# Patient Record
Sex: MALE | Race: WHITE | NOT HISPANIC OR LATINO
[De-identification: names, ages, dates, MRNs, and addresses within clinical notes are randomized per-mention and may not be internally consistent; named-entity substitution may affect disease eponyms.]

---

## 2017-05-20 ENCOUNTER — TRANSCRIPTION ENCOUNTER (OUTPATIENT)
Age: 77
End: 2017-05-20

## 2017-07-05 ENCOUNTER — TRANSCRIPTION ENCOUNTER (OUTPATIENT)
Age: 77
End: 2017-07-05

## 2017-10-23 ENCOUNTER — OUTPATIENT (OUTPATIENT)
Dept: OUTPATIENT SERVICES | Facility: HOSPITAL | Age: 77
LOS: 1 days | End: 2017-10-23
Payer: MEDICARE

## 2017-10-23 PROCEDURE — 73600 X-RAY EXAM OF ANKLE: CPT

## 2017-10-23 PROCEDURE — 73630 X-RAY EXAM OF FOOT: CPT

## 2017-10-23 PROCEDURE — 73600 X-RAY EXAM OF ANKLE: CPT | Mod: 26,RT

## 2017-10-23 PROCEDURE — 73630 X-RAY EXAM OF FOOT: CPT | Mod: 26,RT

## 2018-08-23 VITALS
TEMPERATURE: 98 F | OXYGEN SATURATION: 98 % | HEART RATE: 68 BPM | DIASTOLIC BLOOD PRESSURE: 69 MMHG | HEIGHT: 68 IN | RESPIRATION RATE: 18 BRPM | SYSTOLIC BLOOD PRESSURE: 110 MMHG | WEIGHT: 168.21 LBS

## 2018-08-24 ENCOUNTER — INPATIENT (INPATIENT)
Facility: HOSPITAL | Age: 78
LOS: 0 days | Discharge: HOME HEALTH | DRG: 502 | End: 2018-08-25
Attending: ORTHOPAEDIC SURGERY | Admitting: ORTHOPAEDIC SURGERY
Payer: COMMERCIAL

## 2018-08-24 DIAGNOSIS — Z90.49 ACQUIRED ABSENCE OF OTHER SPECIFIED PARTS OF DIGESTIVE TRACT: Chronic | ICD-10-CM

## 2018-08-24 DIAGNOSIS — Z98.890 OTHER SPECIFIED POSTPROCEDURAL STATES: Chronic | ICD-10-CM

## 2018-08-24 RX ORDER — MORPHINE SULFATE 50 MG/1
4 CAPSULE, EXTENDED RELEASE ORAL EVERY 4 HOURS
Qty: 0 | Refills: 0 | Status: DISCONTINUED | OUTPATIENT
Start: 2018-08-24 | End: 2018-08-25

## 2018-08-24 RX ORDER — DOCUSATE SODIUM 100 MG
100 CAPSULE ORAL THREE TIMES A DAY
Qty: 0 | Refills: 0 | Status: DISCONTINUED | OUTPATIENT
Start: 2018-08-24 | End: 2018-08-25

## 2018-08-24 RX ORDER — OXYCODONE HYDROCHLORIDE 5 MG/1
10 TABLET ORAL EVERY 4 HOURS
Qty: 0 | Refills: 0 | Status: DISCONTINUED | OUTPATIENT
Start: 2018-08-24 | End: 2018-08-25

## 2018-08-24 RX ORDER — SODIUM CHLORIDE 9 MG/ML
1000 INJECTION, SOLUTION INTRAVENOUS
Qty: 0 | Refills: 0 | Status: DISCONTINUED | OUTPATIENT
Start: 2018-08-24 | End: 2018-08-25

## 2018-08-24 RX ORDER — HEPARIN SODIUM 5000 [USP'U]/ML
5000 INJECTION INTRAVENOUS; SUBCUTANEOUS EVERY 12 HOURS
Qty: 0 | Refills: 0 | Status: DISCONTINUED | OUTPATIENT
Start: 2018-08-24 | End: 2018-08-25

## 2018-08-24 RX ORDER — MORPHINE SULFATE 50 MG/1
4 CAPSULE, EXTENDED RELEASE ORAL
Qty: 0 | Refills: 0 | Status: DISCONTINUED | OUTPATIENT
Start: 2018-08-24 | End: 2018-08-25

## 2018-08-24 RX ORDER — CEFAZOLIN SODIUM 1 G
2000 VIAL (EA) INJECTION EVERY 8 HOURS
Qty: 0 | Refills: 0 | Status: DISCONTINUED | OUTPATIENT
Start: 2018-08-24 | End: 2018-08-24

## 2018-08-24 RX ORDER — ACETAMINOPHEN 500 MG
975 TABLET ORAL EVERY 8 HOURS
Qty: 0 | Refills: 0 | Status: DISCONTINUED | OUTPATIENT
Start: 2018-08-24 | End: 2018-08-25

## 2018-08-24 RX ORDER — KETOROLAC TROMETHAMINE 30 MG/ML
30 SYRINGE (ML) INJECTION EVERY 6 HOURS
Qty: 0 | Refills: 0 | Status: DISCONTINUED | OUTPATIENT
Start: 2018-08-24 | End: 2018-08-25

## 2018-08-24 RX ORDER — OXYCODONE HYDROCHLORIDE 5 MG/1
5 TABLET ORAL EVERY 4 HOURS
Qty: 0 | Refills: 0 | Status: DISCONTINUED | OUTPATIENT
Start: 2018-08-24 | End: 2018-08-25

## 2018-08-24 RX ORDER — MAGNESIUM HYDROXIDE 400 MG/1
30 TABLET, CHEWABLE ORAL DAILY
Qty: 0 | Refills: 0 | Status: DISCONTINUED | OUTPATIENT
Start: 2018-08-24 | End: 2018-08-25

## 2018-08-24 RX ORDER — ONDANSETRON 8 MG/1
4 TABLET, FILM COATED ORAL EVERY 4 HOURS
Qty: 0 | Refills: 0 | Status: DISCONTINUED | OUTPATIENT
Start: 2018-08-24 | End: 2018-08-25

## 2018-08-24 RX ORDER — CEFAZOLIN SODIUM 1 G
2000 VIAL (EA) INJECTION EVERY 8 HOURS
Qty: 0 | Refills: 0 | Status: COMPLETED | OUTPATIENT
Start: 2018-08-24 | End: 2018-08-25

## 2018-08-24 RX ADMIN — SODIUM CHLORIDE 125 MILLILITER(S): 9 INJECTION, SOLUTION INTRAVENOUS at 21:29

## 2018-08-24 RX ADMIN — Medication 100 MILLIGRAM(S): at 22:49

## 2018-08-24 RX ADMIN — Medication 2000 MILLIGRAM(S): at 22:49

## 2018-08-24 RX ADMIN — HEPARIN SODIUM 5000 UNIT(S): 5000 INJECTION INTRAVENOUS; SUBCUTANEOUS at 22:49

## 2018-08-24 RX ADMIN — Medication 975 MILLIGRAM(S): at 22:49

## 2018-08-24 NOTE — PROGRESS NOTE ADULT - SUBJECTIVE AND OBJECTIVE BOX
Ortho Post Op Check    Procedure: R flatfoot recon  Surgeon: Dr. Corbett     Pt comfortable without complaints, pain controlled  Denies CP, SOB, N/V, numbness/tingling     Vital Signs Last 24 Hrs  T(C): --  T(F): --  HR: --  BP: --  BP(mean): --  RR: --  SpO2: --    General: Pt Alert and oriented, NAD  DSG C/D/I, bulky rosario  Pulses: toes wwp, cap refill brisk  Sensation: sensattion globally intact in toes  Motor: wiggling toes    =  Post-op X-Ray: pending    A/P: 78yMale POD#0 s/p R flatfoot recon  - Stable  - Pain Control  - DVT ppx: ASA  - Post op abx: ancef  - PT, WBS: NWB RLE    Ortho Pager [phone]

## 2018-08-25 VITALS
OXYGEN SATURATION: 98 % | SYSTOLIC BLOOD PRESSURE: 116 MMHG | HEART RATE: 77 BPM | DIASTOLIC BLOOD PRESSURE: 64 MMHG | TEMPERATURE: 98 F | RESPIRATION RATE: 18 BRPM

## 2018-08-25 LAB
ANION GAP SERPL CALC-SCNC: 13 MMOL/L — SIGNIFICANT CHANGE UP (ref 5–17)
BASOPHILS NFR BLD AUTO: 0.1 % — SIGNIFICANT CHANGE UP (ref 0–2)
BUN SERPL-MCNC: 20 MG/DL — SIGNIFICANT CHANGE UP (ref 7–23)
CALCIUM SERPL-MCNC: 8.6 MG/DL — SIGNIFICANT CHANGE UP (ref 8.4–10.5)
CHLORIDE SERPL-SCNC: 105 MMOL/L — SIGNIFICANT CHANGE UP (ref 96–108)
CO2 SERPL-SCNC: 24 MMOL/L — SIGNIFICANT CHANGE UP (ref 22–31)
CREAT SERPL-MCNC: 1.23 MG/DL — SIGNIFICANT CHANGE UP (ref 0.5–1.3)
GLUCOSE SERPL-MCNC: 132 MG/DL — HIGH (ref 70–99)
HCT VFR BLD CALC: 40.9 % — SIGNIFICANT CHANGE UP (ref 39–50)
HGB BLD-MCNC: 13.6 G/DL — SIGNIFICANT CHANGE UP (ref 13–17)
LYMPHOCYTES # BLD AUTO: 4.4 % — LOW (ref 13–44)
MCHC RBC-ENTMCNC: 30.4 PG — SIGNIFICANT CHANGE UP (ref 27–34)
MCHC RBC-ENTMCNC: 33.3 G/DL — SIGNIFICANT CHANGE UP (ref 32–36)
MCV RBC AUTO: 91.5 FL — SIGNIFICANT CHANGE UP (ref 80–100)
MONOCYTES NFR BLD AUTO: 5.5 % — SIGNIFICANT CHANGE UP (ref 2–14)
NEUTROPHILS NFR BLD AUTO: 90 % — HIGH (ref 43–77)
PLATELET # BLD AUTO: 203 K/UL — SIGNIFICANT CHANGE UP (ref 150–400)
POTASSIUM SERPL-MCNC: 4.2 MMOL/L — SIGNIFICANT CHANGE UP (ref 3.5–5.3)
POTASSIUM SERPL-SCNC: 4.2 MMOL/L — SIGNIFICANT CHANGE UP (ref 3.5–5.3)
RBC # BLD: 4.47 M/UL — SIGNIFICANT CHANGE UP (ref 4.2–5.8)
RBC # FLD: 12.9 % — SIGNIFICANT CHANGE UP (ref 10.3–16.9)
SODIUM SERPL-SCNC: 142 MMOL/L — SIGNIFICANT CHANGE UP (ref 135–145)
WBC # BLD: 16.1 K/UL — HIGH (ref 3.8–10.5)
WBC # FLD AUTO: 16.1 K/UL — HIGH (ref 3.8–10.5)

## 2018-08-25 PROCEDURE — 73630 X-RAY EXAM OF FOOT: CPT | Mod: 26,RT

## 2018-08-25 RX ADMIN — Medication 100 MILLIGRAM(S): at 05:47

## 2018-08-25 RX ADMIN — Medication 2000 MILLIGRAM(S): at 05:48

## 2018-08-25 RX ADMIN — HEPARIN SODIUM 5000 UNIT(S): 5000 INJECTION INTRAVENOUS; SUBCUTANEOUS at 05:48

## 2018-08-25 RX ADMIN — Medication 975 MILLIGRAM(S): at 06:00

## 2018-08-25 RX ADMIN — Medication 975 MILLIGRAM(S): at 05:47

## 2018-08-25 NOTE — PHYSICAL THERAPY INITIAL EVALUATION ADULT - ADDITIONAL COMMENTS
Patient reports that he lives in an elevator building with one small steps to enter, +ramp alternative. Wife will be available to assist as needed. Patient has iWalker and knee scooter at home.

## 2018-08-25 NOTE — PHYSICAL THERAPY INITIAL EVALUATION ADULT - GENERAL OBSERVATIONS, REHAB EVAL
Patient received supine in bed with + splint and cryocuff to right lower leg/foot, heplock IV, SCD LLE. Patient in no apparent distress.

## 2018-08-25 NOTE — PROGRESS NOTE ADULT - SUBJECTIVE AND OBJECTIVE BOX
POST OPERATIVE DAY #:  [1 ]   STATUS POST: [ ] Left [x ] Right                     SUBJECTIVE: Patient seen and examined. [ x   ]     Pain (0-10):   Pain Management:  [ ] Pain Controlled Analgesia   [x ] Peripheral Nerve Block    OBJECTIVE:     Vital Signs Last 24 Hrs  T(C): 36.4 (25 Aug 2018 05:56), Max: 37.2 (24 Aug 2018 20:30)  T(F): 97.6 (25 Aug 2018 05:56), Max: 98.9 (24 Aug 2018 20:30)  HR: 74 (25 Aug 2018 05:56) (64 - 82)  BP: 121/64 (25 Aug 2018 05:56) (110/64 - 147/84)  BP(mean): 100 (24 Aug 2018 21:00) (81 - 108)  RR: 17 (25 Aug 2018 05:56) (11 - 33)  SpO2: 97% (25 Aug 2018 05:56) (92% - 100%)    Affected extremity:          Dressing: [ ] clean/dry/intact  [ ] Other:           Sensation: [ ] intact to light touch  [ ] decreased:          Motor exam: [  ] / 5 Tibialis Anterior/Gastrocnemius-Soleus          [ ] warm well perfused; capillary refill <3 seconds     LABS:                MEDICATIONS:acetaminophen   Tablet. 975 milliGRAM(s) Oral every 8 hours  docusate sodium 100 milliGRAM(s) Oral three times a day  heparin  Injectable 5000 Unit(s) SubCutaneous every 12 hours  ketorolac   Injectable 30 milliGRAM(s) IV Push every 6 hours PRN  lactated ringers. 1000 milliLiter(s) IV Continuous <Continuous>  magnesium hydroxide Suspension 30 milliLiter(s) Oral daily PRN  morphine  - Injectable 4 milliGRAM(s) IV Push every 4 hours PRN  morphine  - Injectable 4 milliGRAM(s) IV Push every 15 minutes PRN  ondansetron Injectable 4 milliGRAM(s) IV Push every 4 hours PRN  oxyCODONE    IR 10 milliGRAM(s) Oral every 4 hours PRN  oxyCODONE    IR 5 milliGRAM(s) Oral every 4 hours PRN    Anticoagulation:  heparin  Injectable 5000 Unit(s) SubCutaneous every 12 hours      Antibiotics:       Pain medications:   acetaminophen   Tablet. 975 milliGRAM(s) Oral every 8 hours  ketorolac   Injectable 30 milliGRAM(s) IV Push every 6 hours PRN  morphine  - Injectable 4 milliGRAM(s) IV Push every 4 hours PRN  morphine  - Injectable 4 milliGRAM(s) IV Push every 15 minutes PRN  ondansetron Injectable 4 milliGRAM(s) IV Push every 4 hours PRN  oxyCODONE    IR 10 milliGRAM(s) Oral every 4 hours PRN  oxyCODONE    IR 5 milliGRAM(s) Oral every 4 hours PRN      RADIOLOGY & ADDITIONAL STUDIES:    A/P :  acetaminophen   Tablet. 975 milliGRAM(s) Oral every 8 hours  docusate sodium 100 milliGRAM(s) Oral three times a day  heparin  Injectable 5000 Unit(s) SubCutaneous every 12 hours  ketorolac   Injectable 30 milliGRAM(s) IV Push every 6 hours PRN  lactated ringers. 1000 milliLiter(s) IV Continuous <Continuous>  magnesium hydroxide Suspension 30 milliLiter(s) Oral daily PRN  morphine  - Injectable 4 milliGRAM(s) IV Push every 4 hours PRN  morphine  - Injectable 4 milliGRAM(s) IV Push every 15 minutes PRN  ondansetron Injectable 4 milliGRAM(s) IV Push every 4 hours PRN  oxyCODONE    IR 10 milliGRAM(s) Oral every 4 hours PRN  oxyCODONE    IR 5 milliGRAM(s) Oral every 4 hours PRN   heparin  Injectable 5000 Unit(s) SubCutaneous every 12 hours   s/p Right [x ] Left [ ]  Flat foot recon  POD # 1  -    Pain control  -    DVT ppx: [id number] [ ] [id number] [x ] Lovenox [ ] Coumadin [ ] Heparin  [ ] Eliquis [ ] Xarelto [ ]   -    Periop abx:  Ancef [x ]  Vanco [ ]  Zosyn [ ] Rifampin [ ] Cipro [ ] Bactrim [ ] Ceftriaxone [ ]  Clinda [ ]  Metronidazole [ ]  -    ADAT  -    Check AM labs  -    Weight bearing status: WBAT [ ]        PWB    [ ]     TTWB  [ ]      NWB  [x ]  -    Resume home meds  -    Physical Therapy  -    Dispo: Home [x ]     Rehab [ ]      ROSSY [ ]      To be determined [ ]

## 2018-08-25 NOTE — PHYSICAL THERAPY INITIAL EVALUATION ADULT - ACTIVE RANGE OF MOTION EXAMINATION, REHAB EVAL
right foot/ankle not assessed due to splint/bilateral  lower extremity Active ROM was WFL (within functional limits)/bilateral upper extremity Active ROM was WFL (within functional limits)

## 2018-08-25 NOTE — PHYSICAL THERAPY INITIAL EVALUATION ADULT - MANUAL MUSCLE TESTING RESULTS, REHAB EVAL
B UE and B LE >3+/5 upon functional assessment against gravity. right foot/ankle not assessed due to splint

## 2018-08-25 NOTE — PHYSICAL THERAPY INITIAL EVALUATION ADULT - MODALITIES TREATMENT COMMENTS
Of note, patient initially requested to trial axillary crutches however required mod assist for balance. Then trialed rolling walker, patient demo'd improved balance and reported feeling safer with rolling walker instead of crutches. Agreeable to use rolling walker at home.

## 2018-08-27 ENCOUNTER — TRANSCRIPTION ENCOUNTER (OUTPATIENT)
Age: 78
End: 2018-08-27

## 2018-08-27 NOTE — DISCHARGE NOTE ADULT - CARE PLAN
Principal Discharge DX:	Pes planus, unspecified laterality  Goal:	pain management  Assessment and plan of treatment:	utilize crutches

## 2018-08-27 NOTE — DISCHARGE NOTE ADULT - PATIENT PORTAL LINK FT
You can access the [facility] Patient Portal, offered by [facility], by registering with the following website: [email or web address]

## 2018-08-27 NOTE — DISCHARGE NOTE ADULT - CARE PROVIDER_API CALL
Popyee Corbett), Orthopaedic Surgery  [address]  [address]  [place], [place] [address]  Phone: [phone]  Fax: [phone]

## 2018-08-29 PROCEDURE — 80048 BASIC METABOLIC PNL TOTAL CA: CPT

## 2018-08-29 PROCEDURE — C1889: CPT

## 2018-08-29 PROCEDURE — 73630 X-RAY EXAM OF FOOT: CPT

## 2018-08-29 PROCEDURE — 36415 COLL VENOUS BLD VENIPUNCTURE: CPT

## 2018-08-29 PROCEDURE — 28300 INCISION OF HEEL BONE: CPT

## 2018-08-29 PROCEDURE — 27696 REPAIR OF ANKLE LIGAMENTS: CPT

## 2018-08-29 PROCEDURE — 97161 PT EVAL LOW COMPLEX 20 MIN: CPT

## 2018-08-29 PROCEDURE — 27691 REVISE LOWER LEG TENDON: CPT

## 2018-08-29 PROCEDURE — 27685 REVISION OF LOWER LEG TENDON: CPT

## 2018-08-29 PROCEDURE — 76000 FLUOROSCOPY <1 HR PHYS/QHP: CPT

## 2018-08-29 PROCEDURE — C1713: CPT

## 2018-08-29 PROCEDURE — 85025 COMPLETE CBC W/AUTO DIFF WBC: CPT

## 2018-09-05 DIAGNOSIS — E78.5 HYPERLIPIDEMIA, UNSPECIFIED: ICD-10-CM
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
## 2018-09-05 DIAGNOSIS — M67.01 SHORT ACHILLES TENDON (ACQUIRED), RIGHT ANKLE: ICD-10-CM
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
## 2018-09-05 DIAGNOSIS — M21.41 FLAT FOOT [PES PLANUS] (ACQUIRED), RIGHT FOOT: ICD-10-CM

## 2018-09-05 DIAGNOSIS — N40.0 BENIGN PROSTATIC HYPERPLASIA WITHOUT LOWER URINARY TRACT SYMPTOMS: ICD-10-CM

## 2018-09-05 DIAGNOSIS — S93.411A SPRAIN OF CALCANEOFIBULAR LIGAMENT OF RIGHT ANKLE, INITIAL ENCOUNTER: ICD-10-CM

## 2018-09-05 DIAGNOSIS — I10 ESSENTIAL (PRIMARY) HYPERTENSION: ICD-10-CM

## 2018-09-05 DIAGNOSIS — M19.90 UNSPECIFIED OSTEOARTHRITIS, UNSPECIFIED SITE: ICD-10-CM

## 2018-09-05 DIAGNOSIS — K21.9 GASTRO-ESOPHAGEAL REFLUX DISEASE WITHOUT ESOPHAGITIS: ICD-10-CM

## 2018-09-05 DIAGNOSIS — S86.119A STRAIN OF OTHER MUSCLE(S) AND TENDON(S) OF POSTERIOR MUSCLE GROUP AT LOWER LEG LEVEL, UNSPECIFIED LEG, INITIAL ENCOUNTER: ICD-10-CM

## 2018-11-05 ENCOUNTER — OUTPATIENT (OUTPATIENT)
Dept: OUTPATIENT SERVICES | Facility: HOSPITAL | Age: 78
LOS: 1 days | End: 2018-11-05
Payer: MEDICARE

## 2018-11-05 DIAGNOSIS — Z98.890 OTHER SPECIFIED POSTPROCEDURAL STATES: Chronic | ICD-10-CM

## 2018-11-05 DIAGNOSIS — Z90.49 ACQUIRED ABSENCE OF OTHER SPECIFIED PARTS OF DIGESTIVE TRACT: Chronic | ICD-10-CM

## 2018-11-05 PROBLEM — N40.0 BENIGN PROSTATIC HYPERPLASIA WITHOUT LOWER URINARY TRACT SYMPTOMS: Chronic | Status: ACTIVE | Noted: 2018-08-23

## 2018-11-05 PROBLEM — I10 ESSENTIAL (PRIMARY) HYPERTENSION: Chronic | Status: ACTIVE | Noted: 2018-08-23

## 2018-11-05 PROBLEM — K57.90 DIVERTICULOSIS OF INTESTINE, PART UNSPECIFIED, WITHOUT PERFORATION OR ABSCESS WITHOUT BLEEDING: Chronic | Status: ACTIVE | Noted: 2018-08-23

## 2018-11-05 PROBLEM — E78.5 HYPERLIPIDEMIA, UNSPECIFIED: Chronic | Status: ACTIVE | Noted: 2018-08-23

## 2018-11-05 PROCEDURE — 73630 X-RAY EXAM OF FOOT: CPT

## 2018-11-05 PROCEDURE — 73630 X-RAY EXAM OF FOOT: CPT | Mod: 26,RT

## 2019-01-14 ENCOUNTER — OUTPATIENT (OUTPATIENT)
Dept: OUTPATIENT SERVICES | Facility: HOSPITAL | Age: 79
LOS: 1 days | End: 2019-01-14
Payer: MEDICARE

## 2019-01-14 DIAGNOSIS — Z98.890 OTHER SPECIFIED POSTPROCEDURAL STATES: Chronic | ICD-10-CM

## 2019-01-14 DIAGNOSIS — Z90.49 ACQUIRED ABSENCE OF OTHER SPECIFIED PARTS OF DIGESTIVE TRACT: Chronic | ICD-10-CM

## 2019-01-14 PROCEDURE — 73600 X-RAY EXAM OF ANKLE: CPT | Mod: 26,RT

## 2019-01-14 PROCEDURE — 73630 X-RAY EXAM OF FOOT: CPT | Mod: 26,RT

## 2019-01-14 PROCEDURE — 73630 X-RAY EXAM OF FOOT: CPT

## 2019-01-14 PROCEDURE — 73600 X-RAY EXAM OF ANKLE: CPT

## 2019-06-17 ENCOUNTER — OUTPATIENT (OUTPATIENT)
Dept: OUTPATIENT SERVICES | Facility: HOSPITAL | Age: 79
LOS: 1 days | End: 2019-06-17
Payer: MEDICARE

## 2019-06-17 DIAGNOSIS — Z90.49 ACQUIRED ABSENCE OF OTHER SPECIFIED PARTS OF DIGESTIVE TRACT: Chronic | ICD-10-CM

## 2019-06-17 DIAGNOSIS — Z98.890 OTHER SPECIFIED POSTPROCEDURAL STATES: Chronic | ICD-10-CM

## 2019-06-17 PROCEDURE — 73600 X-RAY EXAM OF ANKLE: CPT | Mod: 26,RT

## 2019-06-17 PROCEDURE — 73630 X-RAY EXAM OF FOOT: CPT | Mod: 26,RT

## 2019-06-17 PROCEDURE — 73630 X-RAY EXAM OF FOOT: CPT

## 2019-06-17 PROCEDURE — 73600 X-RAY EXAM OF ANKLE: CPT

## 2020-01-04 ENCOUNTER — TRANSCRIPTION ENCOUNTER (OUTPATIENT)
Age: 80
End: 2020-01-04

## 2020-02-13 ENCOUNTER — APPOINTMENT (OUTPATIENT)
Dept: ORTHOPEDIC SURGERY | Facility: CLINIC | Age: 80
End: 2020-02-13
Payer: MEDICARE

## 2020-02-13 VITALS — BODY MASS INDEX: 25.31 KG/M2 | WEIGHT: 167 LBS | HEIGHT: 68 IN

## 2020-02-13 DIAGNOSIS — Z86.79 PERSONAL HISTORY OF OTHER DISEASES OF THE CIRCULATORY SYSTEM: ICD-10-CM

## 2020-02-13 DIAGNOSIS — M25.562 PAIN IN LEFT KNEE: ICD-10-CM

## 2020-02-13 DIAGNOSIS — Z56.0 UNEMPLOYMENT, UNSPECIFIED: ICD-10-CM

## 2020-02-13 DIAGNOSIS — Z86.39 PERSONAL HISTORY OF OTHER ENDOCRINE, NUTRITIONAL AND METABOLIC DISEASE: ICD-10-CM

## 2020-02-13 PROBLEM — Z00.00 ENCOUNTER FOR PREVENTIVE HEALTH EXAMINATION: Status: ACTIVE | Noted: 2020-02-13

## 2020-02-13 PROCEDURE — 99215 OFFICE O/P EST HI 40 MIN: CPT

## 2020-02-13 SDOH — ECONOMIC STABILITY - INCOME SECURITY: UNEMPLOYMENT, UNSPECIFIED: Z56.0

## 2020-02-14 PROBLEM — Z86.79 HISTORY OF HYPERTENSION: Status: RESOLVED | Noted: 2020-02-13 | Resolved: 2020-02-14

## 2020-02-14 PROBLEM — M25.562 LEFT KNEE PAIN: Status: ACTIVE | Noted: 2020-02-14

## 2020-02-14 PROBLEM — Z56.0 UNEMPLOYED: Status: ACTIVE | Noted: 2020-02-13

## 2020-02-14 PROBLEM — Z86.39 HISTORY OF HIGH CHOLESTEROL: Status: RESOLVED | Noted: 2020-02-13 | Resolved: 2020-02-14

## 2020-02-14 RX ORDER — BENAZEPRIL HYDROCHLORIDE 10 MG/1
10 TABLET, FILM COATED ORAL
Qty: 90 | Refills: 0 | Status: ACTIVE | COMMUNITY
Start: 2019-12-30

## 2020-02-14 RX ORDER — AMLODIPINE BESYLATE 2.5 MG/1
2.5 TABLET ORAL
Qty: 90 | Refills: 0 | Status: ACTIVE | COMMUNITY
Start: 2020-02-08

## 2020-02-14 RX ORDER — SIMVASTATIN 20 MG/1
20 TABLET, FILM COATED ORAL
Qty: 90 | Refills: 0 | Status: ACTIVE | COMMUNITY
Start: 2020-02-03

## 2020-02-14 RX ORDER — OMEPRAZOLE 20 MG/1
20 CAPSULE, DELAYED RELEASE ORAL
Qty: 90 | Refills: 0 | Status: ACTIVE | COMMUNITY
Start: 2020-02-07

## 2020-02-14 RX ORDER — OMEPRAZOLE 40 MG/1
40 CAPSULE, DELAYED RELEASE ORAL
Qty: 90 | Refills: 0 | Status: ACTIVE | COMMUNITY
Start: 2020-02-03

## 2020-02-14 RX ORDER — CIPROFLOXACIN HYDROCHLORIDE 500 MG/1
500 TABLET, FILM COATED ORAL
Qty: 28 | Refills: 0 | Status: ACTIVE | COMMUNITY
Start: 2020-01-04

## 2020-02-14 RX ORDER — TAMSULOSIN HYDROCHLORIDE 0.4 MG/1
0.4 CAPSULE ORAL
Qty: 90 | Refills: 0 | Status: ACTIVE | COMMUNITY
Start: 2020-02-07

## 2020-02-14 NOTE — HISTORY OF PRESENT ILLNESS
[de-identified] : 79 year old male presents today with recent clicking of his left knee.  He says he has no pain at all but the noise is bothering him.  He is traveling soon and wants to make sure everything is okay.  He denies any instability or locking symptoms.  He did have left knee pain in 2019 and an old MRI from that time but that pain completely resolved.

## 2020-02-14 NOTE — ASSESSMENT
[FreeTextEntry1] : 79 year old male presents with clicking in his left knee.  He has no pain, locking or instability.  He can continue activities as tolerated.  He will followup as needed.  He knows to call with any questions or concerns or if his symptoms acutely worsen.

## 2020-02-14 NOTE — PHYSICAL EXAM
[de-identified] : General: No acute distress, conversant, well-nourished.\par Head: Normocephalic, atraumatic\par Neck: trachea midline, FROM\par Heart: normotensive and normal rate and rhythm\par Lungs: No labored breathing\par Skin: No abrasions, no rashes, no edema\par Psych: Alert and oriented to person, place and time\par Extremities: no peripheral edema or digital cyanosis\par Gait: Normal gait. Can perform tandem gait.  \par Vascular: warm and well perfused distally, palpable distal pulses\par \par Left Knee with no erythema, no effusion.  \par No tenderness to palpation of knee.\par No medial joint line tenderness.\par No lateral joint line tenderness.\par Can reproduce clicking by moving patella with leg extended.  No pain. No patella instablity. \par Range of motion: 0 - 130 degrees\par No pain with range of motion.\par \par Negative Erica's test.\par Stable to varus and valgus stress.\par Negative Lachman's test, negative anterior and posterior drawer tests.  \par \par Sensation intact to light touch.  \par Normal motor exam.\par Warm and well perfused distally. [de-identified] : MRI L knee (4/13/19): small nondisplaced oblique tear of medial meniscus, moderate patellofemoral arthrosis, nondisplaced horizontal tear of lateral meniscus

## 2020-03-17 ENCOUNTER — APPOINTMENT (OUTPATIENT)
Dept: PHYSICAL MEDICINE AND REHAB | Facility: CLINIC | Age: 80
End: 2020-03-17
Payer: MEDICARE

## 2020-03-17 ENCOUNTER — APPOINTMENT (OUTPATIENT)
Dept: ORTHOPEDIC SURGERY | Facility: CLINIC | Age: 80
End: 2020-03-17
Payer: MEDICARE

## 2020-03-17 VITALS — BODY MASS INDEX: 25.31 KG/M2 | HEIGHT: 68 IN | WEIGHT: 167 LBS

## 2020-03-17 DIAGNOSIS — M47.812 SPONDYLOSIS W/OUT MYELOPATHY OR RADICULOPATHY, CERVICAL REGION: ICD-10-CM

## 2020-03-17 DIAGNOSIS — M17.10 UNILATERAL PRIMARY OSTEOARTHRITIS, UNSPECIFIED KNEE: ICD-10-CM

## 2020-03-17 PROCEDURE — 99213 OFFICE O/P EST LOW 20 MIN: CPT | Mod: 25

## 2020-03-17 PROCEDURE — 20610 DRAIN/INJ JOINT/BURSA W/O US: CPT | Mod: LT

## 2020-03-17 PROCEDURE — 99214 OFFICE O/P EST MOD 30 MIN: CPT

## 2020-03-18 PROBLEM — M17.10 ARTHRITIS OF KNEE: Status: ACTIVE | Noted: 2020-03-18

## 2020-03-18 NOTE — PHYSICAL EXAM
[de-identified] : Left knee\par \par Constitutional: \par The patient is healthy-appearing and in no apparent distress. \par \par Gait:\par The patient ambulates with a normal gait and no limp.\par \par Cardiovascular System: \par The capillary refill is less than 2 seconds. \par \par Skin: \par There are no skin abnormalities.\par \par Left Knee:\par  \par Bony Palpation: \par There is tenderness of the medial joint line. \par There is no tenderness of the lateral joint line.\par There is tenderness of the medial femoral chondyle.\par There is no tenderness of the lateral femoral chondyle.\par There is no tenderness of the tibial tubercle.\par There is no tenderness of the superior patella.\par There is no tenderness of the inferior patella.\par There is tenderness of the medial patellar facet.\par There is tenderness of the lateral patellar facet.\par \par Soft Tissue Palpation: \par There is tenderness of the medial retinaculum.\par There is no tenderness of the lateral retinaculum.\par There is no tenderness of the quadriceps tendon.\par There is no tenderness of the patella tendon.\par There is no tenderness of the ITB.\par There is no tenderness of the pes anserine.\par \par Active Range of Motion: \par The range of motion at the knee actively and passively is 0 - 125 \par \par Special Tests: \par There is a negative Apley.\par There is a negative Steinmanns. \par There is a negative Lachman and Anterior Drawer.\par There is a negative Posterior Drawer.  \par There is no varus or valgus laxity.\par \par Strength: \par There is 5/5 hip flexion and 5/5 knee flexion and extension.  \par \par Psychiatric: \par The patient demonstrates a normal mood and affect and is active and alert [de-identified] : MRI left knee.  There is moderate to severe medial patellofemoral arthritis with degenerative tearing of the medial meniscus

## 2020-03-18 NOTE — HISTORY OF PRESENT ILLNESS
[de-identified] : Patient is a new patient is coming in for evaluation of left knee discomfort and pain.  He reports a baseline pain over the last several years which has increased over the last week or so and did from his primary doctor obtained an MRI of his knee.  He states has mild soreness at this time pain mostly to the medial aspect of the knee and mild swelling.  He denies any fall or trauma oral

## 2020-03-18 NOTE — HISTORY OF PRESENT ILLNESS
[FreeTextEntry1] : Patient being evaluated for neck pain. Has MRI done of C spine from Dr. Kauffman's office. MRI shows multi level degenerative changes. Varying degree of stenosis and spondylosis. His major complaint is left sided neck pain that intermittently radiates to the left upper trapezius. Denies paresthesias. Pain is aching, dull and has episode of sharp pain. Pain is moderate and is worst after periods of immobility.

## 2020-03-18 NOTE — PHYSICAL EXAM
[Normal] : Oriented to person, place, and time, insight and judgement were intact and the affect was normal [de-identified] : no gross deformity, TTP over the left cervical paraspinals, ROM limited with lateral rotation to the left, negative Spurling's, negative Garay's\par

## 2020-03-18 NOTE — ASSESSMENT
[FreeTextEntry1] : Discussed at length with patient exam history and imaging.  Patient elects cortisone injection as well as home exercises this time

## 2020-03-18 NOTE — ASSESSMENT
[FreeTextEntry1] : Discussed RFA. Recommend PT for now and consider chiro and acupuncture as well. If no improvement will need left C3/4/5 MBB.

## 2020-03-18 NOTE — PROCEDURE
[de-identified] : Patient has demonstrated limited relief from NSAIDS, rest, exercises / PT, and after discussion of the risks and benefits, the patient has elected to proceed with a corticosteroid injection into the LEFT knee via an Anterolateral site.\par Confirmed that the patient does not have history of prior adverse reactions, active, infections, or relevant allergies.   There was no erythema or warmth, and the skin was clear.  The skin was sterilized with alcohol and via sterile technique, the knee was injected 3 cc of 1% xylocaine with 5 mg Kenalog.  The injection was completed without complication and a bandage was applied.  The patient tolerated the procedure well and was given post-injection instructions.

## 2020-07-09 ENCOUNTER — TRANSCRIPTION ENCOUNTER (OUTPATIENT)
Age: 80
End: 2020-07-09

## 2020-09-15 ENCOUNTER — TRANSCRIPTION ENCOUNTER (OUTPATIENT)
Age: 80
End: 2020-09-15

## 2020-10-04 ENCOUNTER — TRANSCRIPTION ENCOUNTER (OUTPATIENT)
Age: 80
End: 2020-10-04

## 2020-10-04 ENCOUNTER — EMERGENCY (EMERGENCY)
Facility: HOSPITAL | Age: 80
LOS: 1 days | Discharge: HOME | End: 2020-10-04
Admitting: EMERGENCY MEDICINE
Payer: MEDICARE

## 2020-10-04 VITALS
WEIGHT: 179.9 LBS | RESPIRATION RATE: 18 BRPM | SYSTOLIC BLOOD PRESSURE: 143 MMHG | HEART RATE: 77 BPM | TEMPERATURE: 98 F | DIASTOLIC BLOOD PRESSURE: 73 MMHG | HEIGHT: 68 IN | OXYGEN SATURATION: 98 %

## 2020-10-04 DIAGNOSIS — Z98.890 OTHER SPECIFIED POSTPROCEDURAL STATES: Chronic | ICD-10-CM

## 2020-10-04 DIAGNOSIS — Z20.828 CONTACT WITH AND (SUSPECTED) EXPOSURE TO OTHER VIRAL COMMUNICABLE DISEASES: ICD-10-CM

## 2020-10-04 DIAGNOSIS — Z90.49 ACQUIRED ABSENCE OF OTHER SPECIFIED PARTS OF DIGESTIVE TRACT: Chronic | ICD-10-CM

## 2020-10-04 LAB — SARS-COV-2 RNA SPEC QL NAA+PROBE: SIGNIFICANT CHANGE UP

## 2020-10-04 PROCEDURE — 99283 EMERGENCY DEPT VISIT LOW MDM: CPT | Mod: CS

## 2020-10-04 NOTE — ED PROVIDER NOTE - PMH
BPH (benign prostatic hyperplasia)    Diverticulosis    HLD (hyperlipidemia)    HTN (hypertension)

## 2020-10-04 NOTE — ED PROVIDER NOTE - NS_ACPWITHSCRIBE_ED_ALL_ED
"I personally performed the services described in the documentation  recorded by the scribe in my presence, and it accurately and completely records my words and action.”
Detailed exam

## 2020-10-04 NOTE — ED PROVIDER NOTE - OBJECTIVE STATEMENT
79 y/o male with PMHx of BPH, diverticulosis, HLD, and HTN presenting to the ED requesting COVID-19 screening prior to upcoming procedure. Patient is currently asymptomatic and has no other medical complaints at this time. Denies recent travel or known exposure to COVID-19. Denies fever, chills, chest pain, SOB.

## 2020-10-04 NOTE — ED PROVIDER NOTE - PATIENT PORTAL LINK FT
You can access the FollowMyHealth Patient Portal offered by [facility] by registering at the following website: [email or web address]. By joining [employer]’s FollowMyHealth portal, you will also be able to view your health information using other applications (apps) compatible with our system.

## 2020-10-04 NOTE — ED PROVIDER NOTE - NSFOLLOWUPINSTRUCTIONS_ED_ALL_ED_FT
THE COVID 19 TEST RESULTS  - results may take up to 2-3 days to become available   - if you have consented, you will receive your results electronically   -  you can check [employer] [facility] or call [phone] to discuss your results with our nursing staff    Please continue to self quarantine (home isolation) until your result is back and follow instructions accordingly  - positive: complete home isolation for a total of 14 days since day of testing and no more fever with feeling back to baseline   - negative: you will be able to stop home isolation but still practice standard precautions, similar to how you would manage a regular flu/cold.    Return to ER for any worsening symptoms, such as persistent fever >100.4F, shortness of breath, coughing up bloody sputum, chest pain, lethargy, and fainting    Please remember to wash your hands frequently (>20 seconds each time), avoid touching your face, and cover your cough/sneeze.  Always wear a mask when you are outside of your home and practice social distancing.    Only take tylenol for fever/pain control and avoid NSAIDs (ibuprofen/advil/aleve/naproxen) due to potential increased risk of exacerbating COVID-19 infection

## 2020-10-26 ENCOUNTER — APPOINTMENT (OUTPATIENT)
Dept: VASCULAR SURGERY | Facility: CLINIC | Age: 80
End: 2020-10-26
Payer: MEDICARE

## 2020-10-26 PROCEDURE — 93971 EXTREMITY STUDY: CPT

## 2021-05-14 VITALS — BODY MASS INDEX: 26.06 KG/M2 | HEIGHT: 67.5 IN | WEIGHT: 168 LBS

## 2021-05-25 ENCOUNTER — APPOINTMENT (OUTPATIENT)
Dept: NEUROSURGERY | Facility: CLINIC | Age: 81
End: 2021-05-25
Payer: MEDICARE

## 2021-05-25 PROCEDURE — 99204 OFFICE O/P NEW MOD 45 MIN: CPT

## 2021-07-27 NOTE — DISCHARGE NOTE ADULT - BECAUSE OF A PHYSICAL, MENTAL OR EMOTIONAL CONDITION, DO YOU HAVE DIFFICULTY DOING  ERRANDS ALONE LIKE VISITING A DOCTOR'S OFFICE OR SHOPPING (15 YEARS AND OLDER)
Impression: Other abnormal glucose: R73.09. Plan: Discussed with the patient my findings. No diabetic retinopathy on today's exam.  Patient encourage to monitor blood glucose and advised to call office if notes any sudden changes in vision. Patient informed the importance for regular diabetic exams. No

## 2021-08-05 ENCOUNTER — TRANSCRIPTION ENCOUNTER (OUTPATIENT)
Age: 81
End: 2021-08-05

## 2021-08-06 ENCOUNTER — OUTPATIENT (OUTPATIENT)
Dept: OUTPATIENT SERVICES | Facility: HOSPITAL | Age: 81
LOS: 1 days | Discharge: HOME | End: 2021-08-06
Payer: MEDICARE

## 2021-08-06 ENCOUNTER — APPOINTMENT (OUTPATIENT)
Dept: NEUROSURGERY | Facility: HOSPITAL | Age: 81
End: 2021-08-06

## 2021-08-06 DIAGNOSIS — Z90.49 ACQUIRED ABSENCE OF OTHER SPECIFIED PARTS OF DIGESTIVE TRACT: Chronic | ICD-10-CM

## 2021-08-06 DIAGNOSIS — Z98.890 OTHER SPECIFIED POSTPROCEDURAL STATES: Chronic | ICD-10-CM

## 2021-08-06 PROCEDURE — 63685 INS/RPLC SPI NPG/RCVR POCKET: CPT | Mod: AS

## 2021-08-06 PROCEDURE — 95972 ALYS CPLX SP/PN NPGT W/PRGRM: CPT | Mod: 59

## 2021-08-06 PROCEDURE — 63650 IMPLANT NEUROELECTRODES: CPT

## 2021-08-06 PROCEDURE — 63685 INS/RPLC SPI NPG/RCVR POCKET: CPT

## 2021-08-06 RX ORDER — DOCUSATE SODIUM 100 MG/1
100 CAPSULE ORAL 3 TIMES DAILY
Qty: 90 | Refills: 0 | Status: ACTIVE | COMMUNITY
Start: 2021-08-06 | End: 1900-01-01

## 2021-08-06 RX ORDER — OXYCODONE AND ACETAMINOPHEN 5; 325 MG/1; MG/1
5-325 TABLET ORAL
Qty: 28 | Refills: 0 | Status: ACTIVE | COMMUNITY
Start: 2021-08-06 | End: 1900-01-01

## 2021-08-06 RX ORDER — METHOCARBAMOL 500 MG/1
500 TABLET, FILM COATED ORAL 3 TIMES DAILY
Qty: 60 | Refills: 0 | Status: ACTIVE | COMMUNITY
Start: 2021-08-06 | End: 1900-01-01

## 2021-08-31 ENCOUNTER — APPOINTMENT (OUTPATIENT)
Dept: NEUROSURGERY | Facility: CLINIC | Age: 81
End: 2021-08-31
Payer: MEDICARE

## 2021-08-31 DIAGNOSIS — M47.816 SPONDYLOSIS W/OUT MYELOPATHY OR RADICULOPATHY, LUMBAR REGION: ICD-10-CM

## 2021-08-31 DIAGNOSIS — M48.061 SPINAL STENOSIS, LUMBAR REGION WITHOUT NEUROGENIC CLAUDICATION: ICD-10-CM

## 2021-08-31 PROCEDURE — 99213 OFFICE O/P EST LOW 20 MIN: CPT

## 2021-09-28 ENCOUNTER — APPOINTMENT (OUTPATIENT)
Dept: NEUROSURGERY | Facility: CLINIC | Age: 81
End: 2021-09-28